# Patient Record
Sex: MALE | Race: WHITE | Employment: UNEMPLOYED | URBAN - METROPOLITAN AREA
[De-identification: names, ages, dates, MRNs, and addresses within clinical notes are randomized per-mention and may not be internally consistent; named-entity substitution may affect disease eponyms.]

---

## 2020-08-26 ENCOUNTER — HOSPITAL ENCOUNTER (OUTPATIENT)
Dept: PHYSICAL THERAPY | Age: 50
Setting detail: THERAPIES SERIES
Discharge: HOME | End: 2020-08-26
Payer: COMMERCIAL

## 2020-08-26 PROCEDURE — 97161 PT EVAL LOW COMPLEX 20 MIN: CPT

## 2020-08-26 PROCEDURE — 97016 VASOPNEUMATIC DEVICE THERAPY: CPT

## 2020-08-26 ASSESSMENT — PAIN DESCRIPTION - ONSET: ONSET: SUDDEN

## 2020-08-26 ASSESSMENT — PAIN DESCRIPTION - DESCRIPTORS: DESCRIPTORS: SHARP;DULL;DISCOMFORT

## 2020-08-26 ASSESSMENT — PAIN DESCRIPTION - FREQUENCY: FREQUENCY: CONTINUOUS

## 2020-08-26 ASSESSMENT — PAIN DESCRIPTION - PROGRESSION: CLINICAL_PROGRESSION: GRADUALLY IMPROVING

## 2020-08-26 ASSESSMENT — PAIN DESCRIPTION - PAIN TYPE: TYPE: ACUTE PAIN

## 2020-08-26 ASSESSMENT — PAIN DESCRIPTION - ORIENTATION: ORIENTATION: RIGHT

## 2020-08-26 ASSESSMENT — PAIN DESCRIPTION - LOCATION: LOCATION: ANKLE;LEG

## 2020-08-26 ASSESSMENT — PAIN SCALES - GENERAL: PAINLEVEL_OUTOF10: 2

## 2020-08-26 ASSESSMENT — PAIN - FUNCTIONAL ASSESSMENT: PAIN_FUNCTIONAL_ASSESSMENT: PREVENTS OR INTERFERES WITH ALL ACTIVE AND SOME PASSIVE ACTIVITIES

## 2020-08-26 NOTE — PROGRESS NOTES
when he was put into walking boot. Taking Tylenol PRN for pain control. Denies having any N/T into LE/foot. Comments: RTD unsure    Objective:   Balance  Single Leg Stance R Le  Single Leg Stance L Le    Ambulation 1  Surface: carpet  Device: No Device  Other Apparatus: (R walking boot)  Assistance: Independent  Quality of Gait: significant R LE antalgia, toe out  Distance: within dept clinical distances     Transfers  Sit to Stand: Independent  Stand to sit: Independent    Strength RLE  Comment: 4/5 hip, knee 3+/5 ankle  Strength LLE  Comment: 4/5 Hip flex, knee ext, ankle Inver, ever 4+/5 knee flex, ankle DF, PF     AROM RLE (degrees)  RLE General AROM: ankle DF with KE neutral, PF 26, Inver 14, Ever 10, G toe ext 60     AROM LLE (degrees)  LLE General AROM: ankle DF with KE 18, PF 60, Inver 36, Ever 28, G toe ext 70    Observation/Palpation  Posture: Good  Palpation: mod tightness R fibulars and lateral gastroc/soleus complex  Observation: B pes planus, R toe out, calcanela valgus, posterior calf/tendon edema; multible scabs throughout R and L distal LE, R old burn on medial calf with slight open abrasion  Edema: mid malleoli circumfrential meausrements R 26.5 cm L 26.0 cm 1\" above malleoli R 21 cm L 20.8 cm    Additional Measures  Special Tests: (-) pearce     Exercises:   Exercises  Exercise 1: ankle AROM*  Exercise 2: ankle alphabet*  Exercise 3: BAPS*  Exercise 4: towel scrunch*  Exercise 5: great toe ext stretch*  Exercise 6: 4 way ankle with tband*  Exercise 11: *NO FORCED DF*  Modalities:  Modalities  Cryotherapy (Minutes\Location):  Int cold/compression to R ankle for pain/edema control x10 min supine with LE elevated, malleoli circumf pre=26.5 cm post=26.0 cm  Manual:  Manual therapy  PROM: R ankle gentle ankle all planes *NO FORCED DF*  Soft Tissue Mobalization: R fibulars/gastroc/soleus complex*  *Indicates exercise,modality, or manual techniques to be initiated when appropriate  Assessment: Body structures, Functions, Activity limitations: Decreased functional mobility , Decreased ADL status, Decreased ROM, Decreased strength, Increased pain, Decreased balance  Assessment: Pt presents with acute R achilles tendon injury 7/3/20 after falling down stairs. He currently presents with decreased R ankle AROM, decreased R ankle, R knee, and B hip strength, increased R ankle pain/edema, decreased R LE stability, and decreased functional activity tolerance. These impairments currently limit his functional abilities to ambulate, perform stairs, household duties, ADL's, stand prolonged periods, or perform work duties at [employer]. Prognosis: Good  Discharge Recommendations: Continue to assess pending progress  Activity Tolerance: Patient Tolerated treatment well     Decision Making: Low Complexity  History: low  Exam: high; LEFS 32/80  Clinical Presentation: med      Plan  Frequency/Duration:  Plan  Times per week: 2  Plan weeks: 4-6  Current Treatment Recommendations: Strengthening, ROM, Balance Training, Neuromuscular Re-education, Manual Therapy - Soft Tissue Mobilization, Manual Therapy - Joint Manipulation, Modalities, Equipment Evaluation, Education, & procurement, Patient/Caregiver Education & Training, Safety Education & Training, Home Exercise Program, Integrated Dry Needling, Aquatics, Gait Training, Stair training     Patient Education  New Education Provided: PT Education: Goals;PT Role;Plan of Care    POST-PAIN     Pain Rating (0-10 pain scale):   1-2/10  Location and pain description same as pre-treatment unless indicated. Action: [] NA  [] Call Physician  [] Perform HEP  [x] Meds as prescribed    Evaluation and patient rights have been reviewed and patient agrees with plan of care.   Yes  [x]  No  []   Explain:     Ave Fall Risk Assessment  Risk Factor Scale  Score   History of Falls [x] Yes  [] No 25  0 25   Secondary Diagnosis [] Yes  [x] No 15  0    Ambulatory Aid [] Furniture  [x] Crutches/cane/walker  [] None/bedrest/wheelchair/nurse 30  15  0 15   IV/Heparin Lock [] Yes  [x] No 20  0    Gait/Transferring [x] Impaired  [] Weak  [] Normal/bedrest/immobile 20  10  0 20   Mental Status [] Forgets limitations  [x] Oriented to own ability 15  0       Total: 60     Based on the Assessment score: check the appropriate box. []  No intervention needed   Low =   Score of 0-24  []  Use standard prevention interventions Moderate =  Score of 24-44   [] Discuss fall prevention strategies   [] Indicate moderate falls risk on eval  [x]  Use high risk prevention interventions High = Score of 45 and higher   [x] Discuss fall prevention strategies   [x] Provide supervision during treatment time    Goals  Short term goals  Time Frame for Short term goals: 2-3 weeks  Short term goal 1: The pt will report decreased R ankle pain </= 2/10 at worst to increase ease with ADL's  Long term goals  Time Frame for Long term goals : 4-6 weeks  Long term goal 1: The pt will be indep/compliant with HEP in order to self manage symptoms upon D/C  Long term goal 2: The pt will have a increase in LEFS score >/=10 points in order to increase functional activity tolerance  Long term goal 3: The pt will demo improved B LE strength >/= 4+/5 in order to safely ambulate without boot prolonged distances at PLOF  Long term goal 4: The pt will demo improved R ankle and great toe ext AROM WNL's in order to increase ease with ADL's  Long term goal 5:  The pt will demo improved R SLS >/=20 seconds to increase ease with ambulation    Treatment Initiated : modalities    PT Individual Minutes  Time In: 1506  Time Out: 1540  Minutes: 34  Timed Code Treatment Minutes: 0 Minutes  Procedure Minutes: 24-eval 10-int compression     Electronically signed by Keshia Espinosa PT on 8/26/20 at 3:38 PM EDT

## 2020-08-26 NOTE — PROGRESS NOTES
[address]     Ph: [phone]  Fax: [phone]    [] Certification  [] Recertification [x]  Plan of Care  [] Progress Note [] Discharge      To: Mayra Joe      From:  Alannah Prajapati, PT, DPT  Patient: Marry Calvo     : 1970  Diagnosis: Rupture of achilles tendon     Date: 2020  Treatment Diagnosis: decreased R ankle AROM, decreased R ankle, R knee, and B hip strength, increased R ankle pain/edema, decreased R LE stability, and decreased functional activity tolerance     Progress Report Period from:  2020  to 2020    Total # of Visits to Date: 1   No Show: 0    Canceled Appointment: 0     OBJECTIVE:   Short Term Goals - Time Frame for Short term goals: 2-3 weeks    Goals Current/Discharge status  Met   Short term goal 1: The pt will report decreased R ankle pain </= 2/10 at worst to increase ease with ADL's  8/10 at worst [] yes  [x] no     Long Term Goals - Time Frame for Long term goals : 4-6 weeks  Goals Current/ Discharge status Met   Long term goal 1: The pt will be indep/compliant with HEP in order to self manage symptoms upon D/C NA this date  [] yes  [x] no   Long term goal 2: The pt will have a increase in LEFS score >/=10 points in order to increase functional activity tolerance 32/80 [] yes  [x] no   Long term goal 3: The pt will demo improved B LE strength >/= 4+/5 in order to safely ambulate without boot prolonged distances at PLOF Strength RLE  Comment: 4/5 hip, knee 3+/5 ankle  Strength LLE  Comment: 4/5 Hip flex, knee ext, ankle Inver, ever 4+/5 knee flex, ankle DF, PF [] yes  [x] no   Long term goal 4: The pt will demo improved R ankle and great toe ext AROM WNL's in order to increase ease with ADL's AROM RLE (degrees)  RLE General AROM: ankle DF with KE neutral, PF 26, Inver 14, Ever 10, G toe ext 60 [] yes  [x] no   Long term goal 5:  The pt will demo improved R SLS >/=20 seconds to increase ease with ambulation   Unable  [] yes  [x] no      Body structures, Functions, Activity limitations: Decreased functional mobility , Decreased ADL status, Decreased ROM, Decreased strength, Increased pain, Decreased balance  Assessment: Pt presents with acute R achilles tendon injury 7/3/20 after falling down stairs. He currently presents with decreased R ankle AROM, decreased R ankle, R knee, and B hip strength, increased R ankle pain/edema, decreased R LE stability, and decreased functional activity tolerance. These impairments currently limit his functional abilities to ambulate, perform stairs, household duties, ADL's, stand prolonged periods, or perform work duties at [employer]. Prognosis: Good  Discharge Recommendations: Continue to assess pending progress    PT Education: Goals;PT Role;Plan of Care    PLAN: [x] Evaluate and Treat  Frequency/Duration:  Plan  Times per week: 2  Plan weeks: 4-6  Current Treatment Recommendations: Strengthening, ROM, Balance Training, Neuromuscular Re-education, Manual Therapy - Soft Tissue Mobilization, Manual Therapy - Joint Manipulation, Modalities, Equipment Evaluation, Education, & procurement, Patient/Caregiver Education & Training, Safety Education & Training, Home Exercise Program, Integrated Dry Needling, Aquatics, Gait Training, Stair training     Precautions: High falls Risk, Restrictions/Precautions: Weight Bearing Right Lower Extremity Weight Bearing: Weight Bearing As Tolerated      Other position/activity restrictions: WBAT, no forced dorsiflexion                  Patient Status:[x] Continue/ Initiate plan of Care    [] Discharge PT. Recommend pt continue with HEP. [] Additional visits requested, Please re-certify for additional visits:        Signature: Electronically signed by Vikram Sue PT on 8/26/20 at 3:39 PM EDT    If you have any questions or concerns, please don't hesitate to call.   Thank you for your referral.    I have reviewed this plan of care and certify a need for medically necessary rehabilitation services.     Physician Signature:__________________________________________________________  Date:  Please sign and return

## 2020-09-02 ENCOUNTER — HOSPITAL ENCOUNTER (OUTPATIENT)
Dept: PHYSICAL THERAPY | Age: 50
Setting detail: THERAPIES SERIES
Discharge: HOME | End: 2020-09-02
Payer: COMMERCIAL

## 2020-09-02 PROCEDURE — 97016 VASOPNEUMATIC DEVICE THERAPY: CPT

## 2020-09-02 PROCEDURE — 97140 MANUAL THERAPY 1/> REGIONS: CPT

## 2020-09-02 PROCEDURE — 97110 THERAPEUTIC EXERCISES: CPT

## 2020-09-02 ASSESSMENT — PAIN DESCRIPTION - LOCATION: LOCATION: ANKLE

## 2020-09-02 ASSESSMENT — PAIN DESCRIPTION - ORIENTATION: ORIENTATION: RIGHT

## 2020-09-02 ASSESSMENT — PAIN SCALES - GENERAL: PAINLEVEL_OUTOF10: 3

## 2020-09-02 ASSESSMENT — PAIN DESCRIPTION - PAIN TYPE: TYPE: ACUTE PAIN

## 2020-09-02 ASSESSMENT — PAIN DESCRIPTION - DESCRIPTORS: DESCRIPTORS: SORE

## 2020-09-02 NOTE — PROGRESS NOTES
[address] [address]  Outpatient Physical Therapy    Treatment Note        Date: 2020  Patient: Brooke Anderson  : 1970  ACCT #: [de-identified]  Referring Practitioner: Larry Mittal  Diagnosis: Rupture of achilles tendon    Visit Information:  PT Visit Information  PT Insurance Information: jus  Total # of Visits Approved: 30  Total # of Visits to Date: 2  No Show: 1  Canceled Appointment: 0  Progress Note Counter: -    Subjective: Pt stated hes a little sore but not too bad. HEP Compliance:  [x] Good [] Fair [] Poor [] Reports not doing due to:    Vital Signs  Patient Currently in Pain: Yes   Pain Screening  Patient Currently in Pain: Yes  Pain Assessment  Pain Assessment: 0-10  Pain Level: 3  Pain Type: Acute pain  Pain Location: Ankle  Pain Orientation: Right  Pain Descriptors: Sore    OBJECTIVE:   Exercises  Exercise 1: ankle AROM x10  Exercise 2: ankle alphabet x1  Exercise 4: towel scrunch x2min  Exercise 5: great toe ext stretch 30sec x3  Exercise 6: 4 way ankle with tband x10 with YTB  Exercise 11: *NO FORCED DF*  Exercise 20: HEP: 4-way ankle    Strength: [x] NT  [] MMT completed:    ROM: [x] NT  [] ROM measurements:  Manual:   Manual therapy  PROM: R ankle gentle ankle all planes *NO FORCED DF*  Soft Tissue Mobalization: R fibulars/gastroc/soleus complex  Other: Total Manual time: 12min    Modalities:  Modalities  Cryotherapy (Minutes\Location): Int cold/compression to R ankle for pain/edema control x10 min supine with LE elevated, figure 8 pre=54 cm post53.2= cm     *Indicates exercise, modality, or manual techniques to be initiated when appropriate    Assessment: Body structures, Functions, Activity limitations: Decreased functional mobility , Decreased ADL status, Decreased ROM, Decreased strength, Increased pain, Decreased balance  Assessment: Iniated ex per poc with good hodan noted. pt demo's good ROm and understanding of ex's Iniated HEP this date.   Treatment Diagnosis: 1511  Minutes: 48  Timed Code Treatment Minutes: 38 Minutes  Procedure Minutes:10, vasopneumatic     Timed Activity Minutes Units   Ther Ex 26 2   Manual  12 1       Signature:  Electronically signed by Vidal Davenport PTA on 9/2/20 at 2:26 PM EDT

## 2020-09-14 ENCOUNTER — HOSPITAL ENCOUNTER (OUTPATIENT)
Dept: PHYSICAL THERAPY | Age: 50
Setting detail: THERAPIES SERIES
Discharge: HOME | End: 2020-09-14
Payer: COMMERCIAL

## 2020-09-14 NOTE — PROGRESS NOTES
[address], [address]     Ph: [phone]  Fax: [phone]    [] Certification  [] Recertification []  Plan of Care  [] Progress Note [x] Discharge      To: Leydacyndee Lim      From:  Jyoti Nascimento, PT, DPT  Patient: Nano Cabello     : 1970  Diagnosis: Rupture of achilles tendon     Date: 2020     Progress Report Period from:  2020  to 2020    Total # of Visits to Date: 2   No Show: 4    Canceled Appointment: 0     OBJECTIVE:   Short Term Goals - Time Frame for Short term goals: 2-3 weeks    Goals Current/Discharge status  Met   Short term goal 1: The pt will report decreased R ankle pain </= 2/10 at worst to increase ease with ADL's  NT D/T unexpected D/C. [] yes  [] no     Long Term Goals - Time Frame for Long term goals : 4-6 weeks  Goals Current/ Discharge status Met   Long term goal 1: The pt will be indep/compliant with HEP in order to self manage symptoms upon D/C NT D/T unexpected D/C. [] yes  [] no   Long term goal 2: The pt will have a increase in LEFS score >/=10 points in order to increase functional activity tolerance NT D/T unexpected D/C. [] yes  [] no   Long term goal 3: The pt will demo improved B LE strength >/= 4+/5 in order to safely ambulate without boot prolonged distances at PLOF NT D/T unexpected D/C. [] yes  [] no   Long term goal 4: The pt will demo improved R ankle and great toe ext AROM WNL's in order to increase ease with ADL's NT D/T unexpected D/C. [] yes  [] no   Long term goal 5: The pt will demo improved R SLS >/=20 seconds to increase ease with ambulation NT D/T unexpected D/C. [] yes  [] no      PLAN: Pt with poor attendance/compliance for PT with failure to return after . Unable to determine functional outcomes at this time secondary to unexpected D/C. Patient Status:[] Continue/ Initiate plan of Care    [x] Discharge PT.   Recommend

## 2020-09-14 NOTE — PROGRESS NOTES
[address]       Physical Therapy  Cancellation/No-show Note  Patient Name:  Yamilet Wilkes  :  1970   Date:  2020  Referring Practitioner: Nicolas Lemon  Diagnosis: Rupture of achilles tendon    Visit Information:  PT Visit Information  PT Insurance Information: [employer]  Total # of Visits Approved: 30  Total # of Visits to Date: 2  No Show: 4  Canceled Appointment: 0  Progress Note Counter:  NS 2020    For today's appointment patient:  []  Cancelled  []  Rescheduled appointment  [x]  No-show   []  Called pt to remind of next appointment     Reason given by patient:  []  Patient ill  []  Conflicting appointment  []  No transportation    []  Conflict with work  []  No reason given  [x]  Other:       Comments:    Called/spoke w/ pt regarding no show this date. Pt states \"I'm sorry, I actually in the process of moving so I'll be transferring my therapy. \" Pt requesting to D/C at this time.      Signature: Electronically signed by Dulce Matos PTA on 20 at 2:53 PM EDT

## 2020-09-16 ENCOUNTER — APPOINTMENT (OUTPATIENT)
Dept: PHYSICAL THERAPY | Age: 50
End: 2020-09-16
Payer: COMMERCIAL

## 2020-09-21 ENCOUNTER — APPOINTMENT (OUTPATIENT)
Dept: PHYSICAL THERAPY | Age: 50
End: 2020-09-21
Payer: COMMERCIAL

## 2020-09-23 ENCOUNTER — APPOINTMENT (OUTPATIENT)
Dept: PHYSICAL THERAPY | Age: 50
End: 2020-09-23
Payer: COMMERCIAL